# Patient Record
Sex: FEMALE | Race: WHITE | URBAN - METROPOLITAN AREA
[De-identification: names, ages, dates, MRNs, and addresses within clinical notes are randomized per-mention and may not be internally consistent; named-entity substitution may affect disease eponyms.]

---

## 2017-05-30 ENCOUNTER — HOSPITAL ENCOUNTER (OUTPATIENT)
Dept: OTHER | Age: 22
Discharge: HOME | End: 2017-05-30
Attending: CLINIC/CENTER | Admitting: CLINIC/CENTER

## 2017-06-01 LAB
CULTURE: NORMAL
REPORT STATUS: NORMAL
REQUEST PROBLEM: NORMAL
SPECIMEN: NORMAL

## 2023-06-07 ENCOUNTER — HOSPITAL ENCOUNTER (INPATIENT)
Age: 28
LOS: 2 days | Discharge: HOME | End: 2023-06-09
Attending: OBSTETRICS & GYNECOLOGY | Admitting: OBSTETRICS & GYNECOLOGY
Payer: COMMERCIAL

## 2023-06-07 LAB
ABO/RH: NORMAL
AMPHETAMINES: NEGATIVE
ANTIBODY SCREEN: NEGATIVE
BARBITURATE SCREEN URINE: NEGATIVE
BENZODIAZEPINE SCREEN, URINE: NEGATIVE
CANNABINOID SCREEN URINE: NEGATIVE
COCAINE METABOLITE: NEGATIVE
FENTANYL URINE: ABNORMAL
HCT VFR BLD CALC: 37 % (ref 37–47)
HEMOGLOBIN: 11.9 GM/DL (ref 12.5–16)
MCH RBC QN AUTO: 29.2 PG (ref 27–31)
MCHC RBC AUTO-ENTMCNC: 32.2 % (ref 32–36)
MCV RBC AUTO: 90.7 FL (ref 78–100)
OPIATES, URINE: NEGATIVE
OXYCODONE: NEGATIVE
PDW BLD-RTO: 13.5 % (ref 11.7–14.9)
PLATELET # BLD: 127 K/CU MM (ref 140–440)
PMV BLD AUTO: 12.7 FL (ref 7.5–11.1)
RBC # BLD: 4.08 M/CU MM (ref 4.2–5.4)
WBC # BLD: 8.1 K/CU MM (ref 4–10.5)

## 2023-06-07 PROCEDURE — 80307 DRUG TEST PRSMV CHEM ANLYZR: CPT

## 2023-06-07 PROCEDURE — 1220000000 HC SEMI PRIVATE OB R&B

## 2023-06-07 PROCEDURE — 7200000001 HC VAGINAL DELIVERY

## 2023-06-07 PROCEDURE — 86901 BLOOD TYPING SEROLOGIC RH(D): CPT

## 2023-06-07 PROCEDURE — 6370000000 HC RX 637 (ALT 250 FOR IP): Performed by: OBSTETRICS & GYNECOLOGY

## 2023-06-07 PROCEDURE — 6370000000 HC RX 637 (ALT 250 FOR IP): Performed by: ADVANCED PRACTICE MIDWIFE

## 2023-06-07 PROCEDURE — 86850 RBC ANTIBODY SCREEN: CPT

## 2023-06-07 PROCEDURE — 6360000002 HC RX W HCPCS: Performed by: OBSTETRICS & GYNECOLOGY

## 2023-06-07 PROCEDURE — 6360000002 HC RX W HCPCS

## 2023-06-07 PROCEDURE — 86900 BLOOD TYPING SEROLOGIC ABO: CPT

## 2023-06-07 PROCEDURE — 85027 COMPLETE CBC AUTOMATED: CPT

## 2023-06-07 RX ORDER — IBUPROFEN 800 MG/1
800 TABLET ORAL EVERY 8 HOURS PRN
Status: DISCONTINUED | OUTPATIENT
Start: 2023-06-07 | End: 2023-06-09 | Stop reason: SDUPTHER

## 2023-06-07 RX ORDER — ESCITALOPRAM OXALATE 10 MG/1
20 TABLET ORAL DAILY
Qty: 30 TABLET | Refills: 3 | Status: SHIPPED | OUTPATIENT
Start: 2023-06-07

## 2023-06-07 RX ORDER — ESCITALOPRAM OXALATE 10 MG/1
20 TABLET ORAL DAILY
Status: DISCONTINUED | OUTPATIENT
Start: 2023-06-07 | End: 2023-06-09 | Stop reason: HOSPADM

## 2023-06-07 RX ORDER — ESCITALOPRAM OXALATE 20 MG/1
20 TABLET ORAL DAILY
COMMUNITY
Start: 2023-06-02

## 2023-06-07 RX ORDER — FENTANYL CITRATE 50 UG/ML
100 INJECTION, SOLUTION INTRAMUSCULAR; INTRAVENOUS
Status: DISCONTINUED | OUTPATIENT
Start: 2023-06-07 | End: 2023-06-09 | Stop reason: HOSPADM

## 2023-06-07 RX ORDER — IBUPROFEN 800 MG/1
800 TABLET ORAL EVERY 8 HOURS
Status: DISCONTINUED | OUTPATIENT
Start: 2023-06-07 | End: 2023-06-09 | Stop reason: HOSPADM

## 2023-06-07 RX ORDER — PNV NO.95/FERROUS FUM/FOLIC AC 28MG-0.8MG
1 TABLET ORAL DAILY
COMMUNITY
Start: 2023-06-03

## 2023-06-07 RX ORDER — SODIUM CHLORIDE 0.9 % (FLUSH) 0.9 %
5-40 SYRINGE (ML) INJECTION EVERY 12 HOURS SCHEDULED
Status: DISCONTINUED | OUTPATIENT
Start: 2023-06-07 | End: 2023-06-09 | Stop reason: HOSPADM

## 2023-06-07 RX ORDER — CARBOPROST TROMETHAMINE 250 UG/ML
250 INJECTION, SOLUTION INTRAMUSCULAR PRN
Status: DISCONTINUED | OUTPATIENT
Start: 2023-06-07 | End: 2023-06-07 | Stop reason: SDUPTHER

## 2023-06-07 RX ORDER — OXYTOCIN 10 [USP'U]/ML
10 INJECTION, SOLUTION INTRAMUSCULAR; INTRAVENOUS ONCE
Status: COMPLETED | OUTPATIENT
Start: 2023-06-07 | End: 2023-06-07

## 2023-06-07 RX ORDER — METHYLERGONOVINE MALEATE 0.2 MG/ML
200 INJECTION INTRAVENOUS PRN
Status: DISCONTINUED | OUTPATIENT
Start: 2023-06-07 | End: 2023-06-09 | Stop reason: HOSPADM

## 2023-06-07 RX ORDER — ONDANSETRON 4 MG/1
4 TABLET, ORALLY DISINTEGRATING ORAL EVERY 6 HOURS PRN
COMMUNITY
Start: 2023-06-02

## 2023-06-07 RX ORDER — MISOPROSTOL 200 UG/1
800 TABLET ORAL PRN
Status: DISCONTINUED | OUTPATIENT
Start: 2023-06-07 | End: 2023-06-09 | Stop reason: HOSPADM

## 2023-06-07 RX ORDER — SODIUM CHLORIDE 0.9 % (FLUSH) 0.9 %
5-40 SYRINGE (ML) INJECTION PRN
Status: DISCONTINUED | OUTPATIENT
Start: 2023-06-07 | End: 2023-06-09 | Stop reason: HOSPADM

## 2023-06-07 RX ORDER — SIMETHICONE 80 MG
80 TABLET,CHEWABLE ORAL EVERY 6 HOURS PRN
Status: DISCONTINUED | OUTPATIENT
Start: 2023-06-07 | End: 2023-06-09 | Stop reason: HOSPADM

## 2023-06-07 RX ORDER — SODIUM CHLORIDE 0.9 % (FLUSH) 0.9 %
5-40 SYRINGE (ML) INJECTION EVERY 12 HOURS SCHEDULED
Status: DISCONTINUED | OUTPATIENT
Start: 2023-06-07 | End: 2023-06-07 | Stop reason: SDUPTHER

## 2023-06-07 RX ORDER — DOCUSATE SODIUM 100 MG/1
100 CAPSULE, LIQUID FILLED ORAL 2 TIMES DAILY
Status: DISCONTINUED | OUTPATIENT
Start: 2023-06-07 | End: 2023-06-09 | Stop reason: HOSPADM

## 2023-06-07 RX ORDER — SODIUM CHLORIDE, SODIUM LACTATE, POTASSIUM CHLORIDE, AND CALCIUM CHLORIDE .6; .31; .03; .02 G/100ML; G/100ML; G/100ML; G/100ML
1000 INJECTION, SOLUTION INTRAVENOUS PRN
Status: DISCONTINUED | OUTPATIENT
Start: 2023-06-07 | End: 2023-06-09 | Stop reason: HOSPADM

## 2023-06-07 RX ORDER — ACETAMINOPHEN 500 MG
1000 TABLET ORAL EVERY 8 HOURS
Status: DISCONTINUED | OUTPATIENT
Start: 2023-06-07 | End: 2023-06-09 | Stop reason: HOSPADM

## 2023-06-07 RX ORDER — LANOLIN 72 %
OINTMENT (GRAM) TOPICAL PRN
Status: DISCONTINUED | OUTPATIENT
Start: 2023-06-07 | End: 2023-06-09 | Stop reason: HOSPADM

## 2023-06-07 RX ORDER — SODIUM CHLORIDE, SODIUM LACTATE, POTASSIUM CHLORIDE, CALCIUM CHLORIDE 600; 310; 30; 20 MG/100ML; MG/100ML; MG/100ML; MG/100ML
INJECTION, SOLUTION INTRAVENOUS CONTINUOUS
Status: DISCONTINUED | OUTPATIENT
Start: 2023-06-07 | End: 2023-06-09 | Stop reason: HOSPADM

## 2023-06-07 RX ORDER — CARBOPROST TROMETHAMINE 250 UG/ML
250 INJECTION, SOLUTION INTRAMUSCULAR PRN
Status: DISCONTINUED | OUTPATIENT
Start: 2023-06-07 | End: 2023-06-09 | Stop reason: HOSPADM

## 2023-06-07 RX ORDER — FAMOTIDINE 20 MG/1
20 TABLET, FILM COATED ORAL 2 TIMES DAILY PRN
Status: DISCONTINUED | OUTPATIENT
Start: 2023-06-07 | End: 2023-06-09 | Stop reason: HOSPADM

## 2023-06-07 RX ORDER — OXYCODONE HYDROCHLORIDE 5 MG/1
5 TABLET ORAL ONCE
Status: COMPLETED | OUTPATIENT
Start: 2023-06-07 | End: 2023-06-07

## 2023-06-07 RX ORDER — SODIUM CHLORIDE 9 MG/ML
25 INJECTION, SOLUTION INTRAVENOUS PRN
Status: DISCONTINUED | OUTPATIENT
Start: 2023-06-07 | End: 2023-06-09 | Stop reason: HOSPADM

## 2023-06-07 RX ORDER — KETOROLAC TROMETHAMINE 30 MG/ML
30 INJECTION, SOLUTION INTRAMUSCULAR; INTRAVENOUS ONCE
Status: DISCONTINUED | OUTPATIENT
Start: 2023-06-07 | End: 2023-06-07

## 2023-06-07 RX ORDER — TRANEXAMIC ACID 10 MG/ML
1000 INJECTION, SOLUTION INTRAVENOUS
Status: ACTIVE | OUTPATIENT
Start: 2023-06-07 | End: 2023-06-08

## 2023-06-07 RX ORDER — SODIUM CHLORIDE 0.9 % (FLUSH) 0.9 %
5-40 SYRINGE (ML) INJECTION PRN
Status: DISCONTINUED | OUTPATIENT
Start: 2023-06-07 | End: 2023-06-07 | Stop reason: SDUPTHER

## 2023-06-07 RX ORDER — DOXYLAMINE SUCCINATE 25 MG
TABLET ORAL
Status: ON HOLD | COMMUNITY
Start: 2023-05-25 | End: 2023-06-08 | Stop reason: HOSPADM

## 2023-06-07 RX ORDER — NICOTINE 21 MG/24HR
1 PATCH, TRANSDERMAL 24 HOURS TRANSDERMAL DAILY
Status: DISCONTINUED | OUTPATIENT
Start: 2023-06-07 | End: 2023-06-09 | Stop reason: HOSPADM

## 2023-06-07 RX ORDER — SODIUM CHLORIDE, SODIUM LACTATE, POTASSIUM CHLORIDE, AND CALCIUM CHLORIDE .6; .31; .03; .02 G/100ML; G/100ML; G/100ML; G/100ML
500 INJECTION, SOLUTION INTRAVENOUS PRN
Status: DISCONTINUED | OUTPATIENT
Start: 2023-06-07 | End: 2023-06-09 | Stop reason: HOSPADM

## 2023-06-07 RX ORDER — MISOPROSTOL 200 UG/1
800 TABLET ORAL PRN
Status: DISCONTINUED | OUTPATIENT
Start: 2023-06-07 | End: 2023-06-07 | Stop reason: SDUPTHER

## 2023-06-07 RX ORDER — FENTANYL CITRATE 50 UG/ML
INJECTION, SOLUTION INTRAMUSCULAR; INTRAVENOUS
Status: COMPLETED
Start: 2023-06-07 | End: 2023-06-07

## 2023-06-07 RX ORDER — SODIUM CHLORIDE 9 MG/ML
INJECTION, SOLUTION INTRAVENOUS PRN
Status: DISCONTINUED | OUTPATIENT
Start: 2023-06-07 | End: 2023-06-09 | Stop reason: HOSPADM

## 2023-06-07 RX ORDER — ONDANSETRON 4 MG/1
4 TABLET, ORALLY DISINTEGRATING ORAL EVERY 6 HOURS PRN
Status: DISCONTINUED | OUTPATIENT
Start: 2023-06-07 | End: 2023-06-09 | Stop reason: HOSPADM

## 2023-06-07 RX ADMIN — ACETAMINOPHEN 1000 MG: 500 TABLET ORAL at 14:57

## 2023-06-07 RX ADMIN — OXYCODONE HYDROCHLORIDE 5 MG: 5 TABLET ORAL at 16:45

## 2023-06-07 RX ADMIN — FENTANYL CITRATE 100 MCG: 50 INJECTION INTRAMUSCULAR; INTRAVENOUS at 10:45

## 2023-06-07 RX ADMIN — Medication: at 10:50

## 2023-06-07 RX ADMIN — OXYTOCIN 10 UNITS: 10 INJECTION INTRAVENOUS at 10:33

## 2023-06-07 RX ADMIN — IBUPROFEN 800 MG: 800 TABLET ORAL at 11:55

## 2023-06-07 ASSESSMENT — PAIN SCALES - GENERAL
PAINLEVEL_OUTOF10: 3
PAINLEVEL_OUTOF10: 7
PAINLEVEL_OUTOF10: 4
PAINLEVEL_OUTOF10: 7

## 2023-06-07 ASSESSMENT — PAIN DESCRIPTION - LOCATION
LOCATION: ABDOMEN

## 2023-06-07 ASSESSMENT — PAIN DESCRIPTION - ORIENTATION
ORIENTATION: LOWER;MID
ORIENTATION: LOWER;MID
ORIENTATION: MID;LOWER
ORIENTATION: MID;LOWER

## 2023-06-07 ASSESSMENT — PAIN DESCRIPTION - PAIN TYPE
TYPE: ACUTE PAIN

## 2023-06-07 ASSESSMENT — PAIN DESCRIPTION - FREQUENCY
FREQUENCY: INTERMITTENT

## 2023-06-07 ASSESSMENT — PAIN DESCRIPTION - DESCRIPTORS
DESCRIPTORS: CRAMPING

## 2023-06-07 ASSESSMENT — PAIN DESCRIPTION - ONSET
ONSET: ON-GOING

## 2023-06-07 ASSESSMENT — PAIN - FUNCTIONAL ASSESSMENT
PAIN_FUNCTIONAL_ASSESSMENT: ACTIVITIES ARE NOT PREVENTED

## 2023-06-07 NOTE — PROGRESS NOTES
Patient Urine sample colleted for UDS,However patient received Fentanyl dose prior to collection.  See [facility]

## 2023-06-07 NOTE — L&D DELIVERY NOTE
Juhi [facility] [[id number]]      Labor Events      Cervical Ripening Date/Time:        Rupture Date/Time:                    Delivery Details      Delivery Date: 23 Delivery Time: 09:54:00   Delivery Type: Vaginal, Spontaneous              [place] Presentation    _: Occiput       Shoulder Dystocia    Shoulder Dystocia Present?: No       Cord    Vessels: 3 Vessels  Complications: None              Placenta           Lacerations           Blood Loss  Mother: Frederik Osler #[id number]     Start of Mother's Information      Delivery Blood Loss  23 2154 - 23 1110      None                 End of Mother's Information  Mother: Frederik Osler #[id number]                Delivery Providers    Delivering clinician:               Assessment    Living Status: Living  Delivery Location Comment: BORN AT HOME,ARRIVED BY EMT        Skin Color:   Heart Rate:   Reflex Irritability:   Muscle Tone:   Respiratory Effort: Total:            1 Minute:    0    2    1    1    1    5         5 Minute:    1    2    2    1    2    8         10 Minute: 1    2    2    2    2    9                                   Apgars Assigned By: PER EMT              Resuscitation    Method: Bulb Suction, Stimulation             [place] Measurements      Birth Weight: 3485 g   Birth Length: 50.8 cm     Head Circumference: 35 cm     Chest Circumference: 33 cm     Abdominal Girth: 31 cm              Skin to Skin      Reason Skin to Skin Not Initiated: Patient Refused                    Pt delivered at home with medics. Placenta still undelivered upon arrival to the hospital. Placenta delivered spontaneously on Rodriguez side. Several clots with delivery of placenta. Fundus firm to massage. 10 units IM pitocin administered. IV obtained and IV fentanyl given for pain relief. Perineum inspected and found to be intact. EBL (since arrival to the hospital) 350ml.     DORCAS Coronado CNM

## 2023-06-07 NOTE — PROGRESS NOTES
Patient up to bathroom. Gait steady. Voids without difficulty. Claudia care complete. Patient returns to bed. Tolerates activity well.

## 2023-06-07 NOTE — H&P
Department of Obstetrics and Gynecology   Obstetrics History and Physical        CHIEF COMPLAINT:   Chief Complaint   Patient presents with    Laboring     Delivered at home         HISTORY OF PRESENT ILLNESS:      The patient is a 32 y.o. female at 38w9d. She delivered at home and was transported to the hospital with the placenta undelivered. OB History          5    Para   4    Term   4            AB   1    Living   4         SAB        IAB        Ectopic        Molar        Multiple   0    Live Births   4            Patient presents with a chief complaint as above and is being admitted for delivered remote from hospital. Admitted for placenta delivery and postpartum care. Estimated Due Date: Estimated Date of Delivery: 23    PRENATAL CARE:    Complicated by: accidental delivery at home. PAST OB HISTORY  OB History          5    Para   4    Term   4            AB   1    Living   4         SAB        IAB        Ectopic        Molar        Multiple   0    Live Births   4                Past Medical History:        Diagnosis Date    Anxiety     History of cystogram 2012    Hx of Doppler ultrasound 2012    Abdomen    Hx: UTI (urinary tract infection)      Past Surgical History:    No past surgical history on file.   Allergies:  Bactrim [sulfamethoxazole-trimethoprim] and Sulfa antibiotics  Social History:    Social History     Socioeconomic History    Marital status: Single     Spouse name: Not on file    Number of children: Not on file    Years of education: Not on file    Highest education level: Not on file   Occupational History    Not on file   Tobacco Use    Smoking status: Never    Smokeless tobacco: Never   Vaping Use    Vaping Use: Never used   Substance and Sexual Activity    Alcohol use: Not Currently    Drug use: Not Currently    Sexual activity: Yes     Partners: Male   Other Topics Concern    Not on file   Social History Narrative    Not on file

## 2023-06-07 NOTE — PROGRESS NOTES
Patient arrives by EMS transport. To [facility] by bed. Reason for arrival is delivery of infant prior to arrival at home. Patient placenta not delivered. Patient assisted into labor bed by EMS transport team. Report received.

## 2023-06-07 NOTE — PROGRESS NOTES
Outpatient Pharmacy Progress Note for Meds-to-Beds    Total number of Prescriptions Filled: 1  The following medications were dispensed to the patient during the discharge process:  Escitalopram     Additional Documentation:  Medication(s) were delivered to the patient's room prior to discharge      Thank you for letting us serve your patients.   [address]    [address], [address] [facility]    Phone: [phone]    Fax: [phone]

## 2023-06-07 NOTE — PROGRESS NOTES
Patient to room MB4 from LD4. Abdomen soft. IV infusing well with no signs of infiltration. Claudia-pad clean and dry with small amount of rubra seen on large white diaper pad. Patient oriented to room, tv, phone, call light, visitation, smoking,all instructed and patient voiced understanding. Family here at bedside. Patient denies any pain. Call light with in reach. Fresh water pitcher, juice and crackers requested and given. Call light in reach.

## 2023-06-07 NOTE — PROGRESS NOTES
Patient reports prenatal care and planned delivery for [facility]. Patient OB history reviewed. Patient denies any complications. Ritesh Rubi CNM at bedside. POC reviewed. Patient voices understanding and agreement.

## 2023-06-07 NOTE — PROGRESS NOTES
Spoke to [employer] requesting additional pain medication for patient. Oxycodone 5mg i po once ordered.

## 2023-06-08 PROCEDURE — 6370000000 HC RX 637 (ALT 250 FOR IP): Performed by: ADVANCED PRACTICE MIDWIFE

## 2023-06-08 PROCEDURE — 6370000000 HC RX 637 (ALT 250 FOR IP): Performed by: OBSTETRICS & GYNECOLOGY

## 2023-06-08 PROCEDURE — 2580000003 HC RX 258: Performed by: ADVANCED PRACTICE MIDWIFE

## 2023-06-08 PROCEDURE — 1220000000 HC SEMI PRIVATE OB R&B

## 2023-06-08 RX ORDER — IBUPROFEN 800 MG/1
800 TABLET ORAL EVERY 8 HOURS PRN
Qty: 120 TABLET | Refills: 3 | Status: SHIPPED | OUTPATIENT
Start: 2023-06-08

## 2023-06-08 RX ORDER — PSEUDOEPHEDRINE HCL 30 MG
100 TABLET ORAL 2 TIMES DAILY
Qty: 30 CAPSULE | Refills: 0 | Status: SHIPPED | OUTPATIENT
Start: 2023-06-08

## 2023-06-08 RX ADMIN — IBUPROFEN 800 MG: 800 TABLET ORAL at 04:28

## 2023-06-08 RX ADMIN — ESCITALOPRAM OXALATE 20 MG: 10 TABLET, FILM COATED ORAL at 22:11

## 2023-06-08 RX ADMIN — IBUPROFEN 800 MG: 800 TABLET ORAL at 17:15

## 2023-06-08 RX ADMIN — DOCUSATE SODIUM 100 MG: 100 CAPSULE, LIQUID FILLED ORAL at 00:36

## 2023-06-08 RX ADMIN — DOCUSATE SODIUM 100 MG: 100 CAPSULE, LIQUID FILLED ORAL at 08:35

## 2023-06-08 RX ADMIN — SODIUM CHLORIDE, PRESERVATIVE FREE 10 ML: 5 INJECTION INTRAVENOUS at 00:38

## 2023-06-08 RX ADMIN — ACETAMINOPHEN 1000 MG: 500 TABLET ORAL at 08:35

## 2023-06-08 RX ADMIN — ACETAMINOPHEN 1000 MG: 500 TABLET ORAL at 20:02

## 2023-06-08 RX ADMIN — IBUPROFEN 800 MG: 800 TABLET ORAL at 12:20

## 2023-06-08 RX ADMIN — ACETAMINOPHEN 1000 MG: 500 TABLET ORAL at 00:36

## 2023-06-08 RX ADMIN — DOCUSATE SODIUM 100 MG: 100 CAPSULE, LIQUID FILLED ORAL at 20:02

## 2023-06-08 RX ADMIN — ESCITALOPRAM OXALATE 20 MG: 10 TABLET, FILM COATED ORAL at 00:36

## 2023-06-08 ASSESSMENT — PAIN SCALES - GENERAL
PAINLEVEL_OUTOF10: 6
PAINLEVEL_OUTOF10: 5
PAINLEVEL_OUTOF10: 4
PAINLEVEL_OUTOF10: 6
PAINLEVEL_OUTOF10: 5

## 2023-06-08 ASSESSMENT — PAIN DESCRIPTION - LOCATION
LOCATION: ABDOMEN

## 2023-06-08 ASSESSMENT — PAIN - FUNCTIONAL ASSESSMENT: PAIN_FUNCTIONAL_ASSESSMENT: ACTIVITIES ARE NOT PREVENTED

## 2023-06-08 ASSESSMENT — PAIN DESCRIPTION - ORIENTATION
ORIENTATION: MID;LOWER
ORIENTATION: MID

## 2023-06-08 ASSESSMENT — PAIN DESCRIPTION - DESCRIPTORS
DESCRIPTORS: CRAMPING

## 2023-06-08 NOTE — PROGRESS NOTES
Department of Obstetrics and Gynecology  Labor and Delivery   Post Partum Progress Note      SUBJECTIVE:  Doing well with no complaints. Reports bleeding is decreasing and pain is well controlled with medication. Has voided without difficulty. Has not had BM, but + flatus. Eating and drinking well. Denies HA/visual changes/epigastric pain. Bottlefeeding is going well. Reports good social support. Denies emotional concerns. OBJECTIVE:      Vitals:  /88   Pulse 65   Temp 97.1 °F (36.2 °C) (Oral)   Resp 19   Ht 5' 7\" (1.702 m)   Wt 190 lb (86.2 kg)   SpO2 98%   Breastfeeding Unknown   BMI 29.76 kg/m²   Lab Results   Component Value Date    WBC 8.1 2023    HGB 11.9 (L) 2023    HCT 37.0 2023    MCV 90.7 2023     (L) 2023       ABDOMEN:  Soft, non-tender. Fundus firm at u-1. BS present x 4 quadrants. LOCHIA: Normal per pt  LUNGS: CTAB  HEART: RRR  EXTREMITIES: No calf tenderness, erythema or swelling bilaterally       ASSESSMENT:      PPD # 1  S/P - home delivery  Bottlefeeding well  GBS Negative   RH O+/A-    PLAN:     Will plan for discharge today per pt request   Discharge teaching completed including counseling on warning signs (heavy vaginal bleeding, s/s of preeclampsia, fever >100.4, ACHES, s/s of PPD). Rx for colace and ibuprofen. Pt to schedule f/u pp visit at 6 weeks in the office.        DORCAS Schofield CNM

## 2023-06-08 NOTE — DISCHARGE INSTRUCTIONS
medical care if:    You have signs of hemorrhage (too much bleeding), such as:  Heavy vaginal bleeding. This means that you are soaking through one or more pads in an hour. Or you pass blood clots bigger than an egg. Feeling dizzy or lightheaded, or you feel like you may faint. Feeling so tired or weak that you cannot do your usual activities. A fast or irregular heartbeat. New or worse belly pain. You have signs of infection, such as:  A fever. Vaginal discharge that smells bad. New or worse belly pain. You have symptoms of a blood clot in your leg (called a deep vein thrombosis), such as:  Pain in the calf, back of the knee, thigh, or groin. Redness and swelling in your leg or groin. You have signs of preeclampsia, such as:  Sudden swelling of your face, hands, or feet. New vision problems (such as dimness, blurring, or seeing spots). A severe headache. Watch closely for changes in your health, and be sure to contact your doctor if:    Your vaginal bleeding isn't decreasing. You feel sad, anxious, or hopeless for more than a few days. You are having problems with your breasts or breastfeeding. Where can you learn more? Go to http://www.woods.com/ and enter Z768 to learn more about \"Postpartum: Care Instructions. \"  Current as of: November 9, 2022               Content Version: 13.6  © 6081-2594 [employer]. Care instructions adapted under license by [facility] ([facility]). If you have questions about a medical condition or this instruction, always ask your healthcare professional. [place] [address] any warranty or liability for your use of this information.

## 2023-06-08 NOTE — DISCHARGE SUMMARY
Obstetrical Discharge Form    Gestational Age:  38w9d    Antepartum complications: none    Date of Delivery:   2023      Type of Delivery:   vaginal, spontaneous at home     Delivered By:    Sesar George CNM(placenta)              Baby:       Information for the patient's :  [place] [facility] [[id number]]      Anesthesia:    None    Intrapartum complications: Precep.  Delivery at home     Feeding method:   bottle -     Postpartum complications: none    Discharge Date:   2023    Condition of discharge:  good    Plan:   Follow up    in 6 week(s) for PP appointment

## 2023-06-09 VITALS
OXYGEN SATURATION: 99 % | TEMPERATURE: 98 F | BODY MASS INDEX: 29.82 KG/M2 | DIASTOLIC BLOOD PRESSURE: 77 MMHG | RESPIRATION RATE: 18 BRPM | SYSTOLIC BLOOD PRESSURE: 135 MMHG | HEIGHT: 67 IN | HEART RATE: 77 BPM | WEIGHT: 190 LBS

## 2023-06-09 PROCEDURE — 6370000000 HC RX 637 (ALT 250 FOR IP): Performed by: ADVANCED PRACTICE MIDWIFE

## 2023-06-09 RX ADMIN — DOCUSATE SODIUM 100 MG: 100 CAPSULE, LIQUID FILLED ORAL at 09:28

## 2023-06-09 RX ADMIN — ACETAMINOPHEN 1000 MG: 500 TABLET ORAL at 06:06

## 2023-06-09 RX ADMIN — IBUPROFEN 800 MG: 800 TABLET ORAL at 09:28

## 2023-06-09 RX ADMIN — IBUPROFEN 800 MG: 800 TABLET ORAL at 01:35

## 2023-06-09 ASSESSMENT — PAIN DESCRIPTION - DESCRIPTORS: DESCRIPTORS: CRAMPING

## 2023-06-09 ASSESSMENT — PAIN DESCRIPTION - ORIENTATION: ORIENTATION: LOWER;MID

## 2023-06-09 ASSESSMENT — PAIN SCALES - GENERAL
PAINLEVEL_OUTOF10: 6
PAINLEVEL_OUTOF10: 5
PAINLEVEL_OUTOF10: 3

## 2023-06-09 ASSESSMENT — PAIN DESCRIPTION - LOCATION: LOCATION: ABDOMEN

## 2023-06-09 NOTE — DISCHARGE SUMMARY
Obstetrical Discharge Form     Gestational Age:                   38w9d     Antepartum complications: none     Date of Delivery:                   2023       Type of Delivery:                   vaginal, spontaneous at home      Delivered By:                          Olu Weinberg CNM(placenta)                                                            Baby:                                        Information for the patient's :  [facility] [[id number]]      Anesthesia:                            None     Intrapartum complications: Precep.  Delivery at home      Feeding method:                   bottle -      Postpartum complications: none     Discharge Date:                    2023     Condition of discharge:  good     Plan:   Follow up                                 in 6 week(s) for PP appointment

## 2023-06-09 NOTE — FLOWSHEET NOTE
Postpartum and infant care discharge teaching completed. Copy of discharge instructions signed by patient and  witnessed by RN. No further questions on teaching points voiced. Prescriptions sent to pharmacy, given instructions on next dosage times and information about drug being taken. Patient plans to follow-up with [facility] Provider for herself in 6 weeks and Pediatric provider for infant. ID bands checked. One of baby's ID bands removed and stapled to discharge footprint sheet, signed by patient and witnessed by RN. Patient discharged in stable condition accompanied by father of baby. Discharged baby in infant car seat.

## 2023-06-09 NOTE — PLAN OF CARE
Problem: Discharge Planning  Goal: Discharge to home or other facility with appropriate resources  6/7/2023 2356 by Franklin Roth RN  Outcome: Progressing  6/7/2023 1657 by Tulio Thornton RN  Outcome: Progressing     Problem: Pain  Goal: Verbalizes/displays adequate comfort level or baseline comfort level  6/7/2023 2356 by Franklin Roth RN  Outcome: Progressing  Flowsheets (Taken 6/7/2023 2051)  Verbalizes/displays adequate comfort level or baseline comfort level:   Encourage patient to monitor pain and request assistance   Assess pain using appropriate pain scale   Administer analgesics based on type and severity of pain and evaluate response   Implement non-pharmacological measures as appropriate and evaluate response   Consider cultural and social influences on pain and pain management   Notify Licensed Independent Practitioner if interventions unsuccessful or patient reports new pain  6/7/2023 1657 by Tulio Thornton RN  Outcome: Progressing  Flowsheets (Taken 6/7/2023 1125 by Petra Lopez RN)  Verbalizes/displays adequate comfort level or baseline comfort level:   Encourage patient to monitor pain and request assistance   Assess pain using appropriate pain scale   Administer analgesics based on type and severity of pain and evaluate response   Implement non-pharmacological measures as appropriate and evaluate response   Consider cultural and social influences on pain and pain management   Notify Licensed Independent Practitioner if interventions unsuccessful or patient reports new pain
Problem: Discharge Planning  Goal: Discharge to home or other facility with appropriate resources  6/7/2023 2357 by Ayala Ornelas RN  Outcome: Progressing  6/7/2023 2356 by Ayala Ornelas RN  Outcome: Progressing  6/7/2023 1657 by Sydnie Goetz RN  Outcome: Progressing     Problem: Pain  Goal: Verbalizes/displays adequate comfort level or baseline comfort level  6/7/2023 2357 by Ayala Ornelas RN  Outcome: Progressing  6/7/2023 2356 by Ayala Ornelas RN  Outcome: Progressing  Flowsheets (Taken 6/7/2023 2051)  Verbalizes/displays adequate comfort level or baseline comfort level:   Encourage patient to monitor pain and request assistance   Assess pain using appropriate pain scale   Administer analgesics based on type and severity of pain and evaluate response   Implement non-pharmacological measures as appropriate and evaluate response   Consider cultural and social influences on pain and pain management   Notify Licensed Independent Practitioner if interventions unsuccessful or patient reports new pain  6/7/2023 1657 by Sydnie Goetz RN  Outcome: Progressing  Flowsheets (Taken 6/7/2023 1125 by Taylor Salazar RN)  Verbalizes/displays adequate comfort level or baseline comfort level:   Encourage patient to monitor pain and request assistance   Assess pain using appropriate pain scale   Administer analgesics based on type and severity of pain and evaluate response   Implement non-pharmacological measures as appropriate and evaluate response   Consider cultural and social influences on pain and pain management   Notify Licensed Independent Practitioner if interventions unsuccessful or patient reports new pain
Problem: Discharge Planning  Goal: Discharge to home or other facility with appropriate resources  Outcome: Completed  Flowsheets (Taken 6/9/2023 0800)  Discharge to home or other facility with appropriate resources: Identify barriers to discharge with patient and caregiver     Problem: Pain  Goal: Verbalizes/displays adequate comfort level or baseline comfort level  Outcome: Completed  Flowsheets (Taken 6/9/2023 0800)  Verbalizes/displays adequate comfort level or baseline comfort level:   Encourage patient to monitor pain and request assistance   Assess pain using appropriate pain scale   Administer analgesics based on type and severity of pain and evaluate response   Implement non-pharmacological measures as appropriate and evaluate response   Consider cultural and social influences on pain and pain management
Problem: Discharge Planning  Goal: Discharge to home or other facility with appropriate resources  Outcome: Progressing     Problem: Pain  Goal: Verbalizes/displays adequate comfort level or baseline comfort level  Outcome: Progressing  Flowsheets (Taken 6/7/2023 1125 by Tod Nieto RN)  Verbalizes/displays adequate comfort level or baseline comfort level:   Encourage patient to monitor pain and request assistance   Assess pain using appropriate pain scale   Administer analgesics based on type and severity of pain and evaluate response   Implement non-pharmacological measures as appropriate and evaluate response   Consider cultural and social influences on pain and pain management   Notify Licensed Independent Practitioner if interventions unsuccessful or patient reports new pain
Problem: Pain  Goal: Verbalizes/displays adequate comfort level or baseline comfort level  6/7/2023 1334 by Kristy Louise RN  Outcome: Adequate for Discharge  6/7/2023 1125 by Kristy Louise RN  Flowsheets (Taken 6/7/2023 1125)  Verbalizes/displays adequate comfort level or baseline comfort level:   Encourage patient to monitor pain and request assistance   Assess pain using appropriate pain scale   Administer analgesics based on type and severity of pain and evaluate response   Implement non-pharmacological measures as appropriate and evaluate response   Consider cultural and social influences on pain and pain management   Notify Licensed Independent Practitioner if interventions unsuccessful or patient reports new pain
Problem: Vaginal Birth or  Section  Goal: Fetal and maternal status remain reassuring during the birth process  Description:  Birth OB-Pregnancy care plan goal which identifies if the fetal and maternal status remain reassuring during the birth process  Flowsheets (Taken 2023)  Fetal and Maternal Status Remain Reassuring During the Birth Process:   Monitor vital signs   Monitor uterine activity   Monitor labor progression (Vaginal delivery)     Problem: Infection - Adult  Goal: Absence of infection at discharge  Flowsheets (Taken 2023)  Absence of infection at discharge:   Assess and monitor for signs and symptoms of infection   Monitor lab/diagnostic results   Monitor all insertion sites i.e., indwelling lines, tubes and drains   [place] appropriate cooling/warming therapies per order   Administer medications as ordered   Instruct and encourage patient and family to use good hand hygiene technique   Identify and instruct in appropriate isolation precautions for identified infection/condition     Problem: Safety - Adult  Goal: Free from fall injury  Flowsheets (Taken 2023)  Free From Fall Injury:   Instruct family/caregiver on patient safety   Based on caregiver fall risk screen, instruct family/caregiver to ask for assistance with transferring infant if caregiver noted to have fall risk factors
caregiver noted to have fall risk factors